# Patient Record
(demographics unavailable — no encounter records)

---

## 2019-01-01 NOTE — DSES
DATE OF ADMISSION:  2019

DATE OF DISCHARGE: 2019

 

DISCHARGE DIAGNOSES:

1. Full term boy.

2. Maternal colonization with Group B Strep.

3. Birth by  section.

 

HISTORY: Rosi Zaldivar is a full term, according to gestational age, baby boy

born by elective  section due to prior maternal back surgery to a

28-year-old mother,  3, para 3. Maternal blood type was A positive.

Culture for Group B Strep were positive. Serology for syphilis and hepatitis B

were both negative. Membranes were ruptured at delivery. Amniotic fluid was

clear.  section was uneventful. Apgar scores were 8 and 9.

 

PHYSICAL EXAMINATION:

Birth weight 3620 grams, which is 8 pounds. Head circumference 34 cm. Length 21

inches.

General Appearance: Alert and responsive, in no apparent distress.

Skin: Well perfused. 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 There

was no other rash.

HEENT: Normocephalic. Anterior fontanelle open and flat. Eyes were normal with

bilateral red reflex. No cleft palate.

Neck: Supple. No masses.

Chest: No thoracic deformities. Good air entry in both lungs. No rales.

Heart sounds were rhythmic. No murmurs. S1 and S2 both normal.

Abdomen: Soft. No masses, no distention. Normal peristalsis.

Genitalia: Normal male. Both testes were descended.

Spine: Straight.

Hip Examination: Normal.

Full range of motion in all extremities.

Femoral pulses were present and symmetrical.

Reflexes were physiologic.

Anus was patent.

There were no gross abnormalities.

 

HOSPITAL COURSE: Rosi Zaldivar did well throughout his nursery stay. On

2019, he was circumcised with Goo clamp #1.1 with no complications. On

2019, his weight was 3366 grams. Transcutaneous bilirubin at 45 hours of

life was 6.5. He was nursing well, alert, responsive, in no distress, mild

jaundice was evident in his physical exam. His circumcision was healing well. The

rest of the exam was normal.

 

DISPOSITION: Rosi Zaldivar is being discharged home on 2019 with a

followup appointment within 2 days. Followup for his nevus flammeus outpatient

basis is recommended.